# Patient Record
Sex: MALE | Race: WHITE | NOT HISPANIC OR LATINO | URBAN - METROPOLITAN AREA
[De-identification: names, ages, dates, MRNs, and addresses within clinical notes are randomized per-mention and may not be internally consistent; named-entity substitution may affect disease eponyms.]

---

## 2017-11-17 ENCOUNTER — EMERGENCY (EMERGENCY)
Facility: HOSPITAL | Age: 37
LOS: 1 days | Discharge: HOME | End: 2017-11-17
Attending: EMERGENCY MEDICINE
Payer: COMMERCIAL

## 2017-11-17 VITALS
TEMPERATURE: 98 F | WEIGHT: 143.96 LBS | SYSTOLIC BLOOD PRESSURE: 123 MMHG | HEART RATE: 87 BPM | OXYGEN SATURATION: 100 % | RESPIRATION RATE: 16 BRPM | DIASTOLIC BLOOD PRESSURE: 93 MMHG

## 2017-11-17 DIAGNOSIS — R07.81 PLEURODYNIA: ICD-10-CM
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
## 2017-11-17 DIAGNOSIS — S29.011A STRAIN OF MUSCLE AND TENDON OF FRONT WALL OF THORAX, INITIAL ENCOUNTER: ICD-10-CM
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
## 2017-11-17 DIAGNOSIS — K59.8 OTHER SPECIFIED FUNCTIONAL INTESTINAL DISORDERS: ICD-10-CM

## 2017-11-17 PROCEDURE — 71100 X-RAY EXAM RIBS UNI 2 VIEWS: CPT

## 2017-11-17 PROCEDURE — 71100 X-RAY EXAM RIBS UNI 2 VIEWS: CPT | Mod: 26

## 2017-11-17 PROCEDURE — 99284 EMERGENCY DEPT VISIT MOD MDM: CPT | Mod: 25

## 2017-11-17 PROCEDURE — 99283 EMERGENCY DEPT VISIT LOW MDM: CPT | Mod: 25

## 2017-11-17 RX ORDER — IBUPROFEN 200 MG
600 TABLET ORAL ONCE
Qty: 0 | Refills: 0 | Status: COMPLETED | OUTPATIENT
Start: 2017-11-17 | End: 2017-11-17

## 2017-11-17 RX ORDER — DICLOFENAC SODIUM 30 MG/G
1 GEL TOPICAL
Qty: 60 | Refills: 0 | OUTPATIENT
Start: 2017-11-17

## 2017-11-17 RX ORDER — LINACLOTIDE 145 UG/1
1 CAPSULE, GELATIN COATED ORAL
Qty: 0 | Refills: 0 | COMMUNITY

## 2017-11-17 NOTE — ED PROVIDER NOTE - AGGRAVATING FACTORS
coughing, deep breathing, hip flexion, rotation positional change/coughing, deep breathing, hip flexion, rotation/twisting

## 2017-11-17 NOTE — ED PROVIDER NOTE - OBJECTIVE STATEMENT
36 y/o M pt w/ PMHx of splenic flexure syndrome presents to ED c/o intermittent localized R rib pain. Pt reports onset w/ coughing on 11/4, when he had laryngitis. Pt states pain was 9/10 in severity w/ coughing on onset. Pt reports he went to ENT doctor, was given Diclofenac for suspected intercostal sprain/strain. Pt took Diclofenac and ibuprofen over 8 days, to mild relief. However, reports pain came back and got worse 3 days ago.  Pt reports sharp pain today after having water w/ lime. Pain is worse w/ deep breathing, coughing, hip flexion, and rotation per pt. Pt last took Motrin an hour ago.

## 2017-11-17 NOTE — ED ADULT NURSE NOTE - OBJECTIVE STATEMENT
pt from home c/o of Rt rib pain worsening with cough or movement on and off since 11/4 worsening today pt is alert awake oriented x3 no acute respiratory distress noted

## 2020-06-27 NOTE — ED ADULT NURSE NOTE - HARM RISK FACTORS
rec counceling- Deonte Jacques- can make appt with her here    rec labs today    rec sertraline medication    Recheck 4 weeks    Encourage exercise 3-5 x a week
no

## 2024-06-19 NOTE — ED PROVIDER NOTE - HISTORY ATTESTATION, MLM
Left message regarding lab work that does need to be done before appt. Will also send a portal message.   ----- Message from Aristides Schulz sent at 6/19/2024 12:06 PM CDT -----  Type: Needs Medical Advice  Who Called:  pt  Pharmacy name and phone #:    Best Call Back Number: [phone]  Additional Information: pt is calling the office asking if he still needs to go take his labs on thios Friday even tho he has an appt nnext Friday. Please call back to advise..   I have reviewed and confirmed nurses' notes...
